# Patient Record
Sex: MALE | Race: WHITE | URBAN - METROPOLITAN AREA
[De-identification: names, ages, dates, MRNs, and addresses within clinical notes are randomized per-mention and may not be internally consistent; named-entity substitution may affect disease eponyms.]

---

## 2022-03-04 ENCOUNTER — OUT OF OFFICE VISIT (OUTPATIENT)
Dept: URBAN - METROPOLITAN AREA MEDICAL CENTER 24 | Facility: MEDICAL CENTER | Age: 52
End: 2022-03-04
Payer: COMMERCIAL

## 2022-03-04 DIAGNOSIS — K92.1 ACUTE MELENA: ICD-10-CM

## 2022-03-04 DIAGNOSIS — K70.30 ALC (ALCOHOLIC LIVER CIRRHOSIS): ICD-10-CM

## 2022-03-04 DIAGNOSIS — K92.0 BLOODY EMESIS: ICD-10-CM

## 2022-03-04 PROCEDURE — 99254 IP/OBS CNSLTJ NEW/EST MOD 60: CPT | Performed by: INTERNAL MEDICINE

## 2022-03-05 ENCOUNTER — OUT OF OFFICE VISIT (OUTPATIENT)
Dept: URBAN - METROPOLITAN AREA MEDICAL CENTER 24 | Facility: MEDICAL CENTER | Age: 52
End: 2022-03-05
Payer: COMMERCIAL

## 2022-03-05 DIAGNOSIS — I85.10 ESOPH VARICE OTHER DIS: ICD-10-CM

## 2022-03-05 DIAGNOSIS — I86.4 BLEEDING GASTRIC VARICES: ICD-10-CM

## 2022-03-05 DIAGNOSIS — K74.69 CIRRHOSIS, CRYPTOGENIC: ICD-10-CM

## 2022-03-05 DIAGNOSIS — D69.6 ACQUIRED THROMBOCYTOPENIA: ICD-10-CM

## 2022-03-05 DIAGNOSIS — D64.89 ANEMIA DUE TO OTHER CAUSE: ICD-10-CM

## 2022-03-05 PROCEDURE — 99232 SBSQ HOSP IP/OBS MODERATE 35: CPT | Performed by: INTERNAL MEDICINE

## 2022-03-05 PROCEDURE — 43235 EGD DIAGNOSTIC BRUSH WASH: CPT | Performed by: INTERNAL MEDICINE

## 2022-03-07 ENCOUNTER — OUT OF OFFICE VISIT (OUTPATIENT)
Dept: URBAN - METROPOLITAN AREA MEDICAL CENTER 24 | Facility: MEDICAL CENTER | Age: 52
End: 2022-03-07
Payer: COMMERCIAL

## 2022-03-07 DIAGNOSIS — I86.4 BLEEDING GASTRIC VARICES: ICD-10-CM

## 2022-03-07 DIAGNOSIS — I85.10 ESOPH VARICE OTHER DIS: ICD-10-CM

## 2022-03-07 DIAGNOSIS — K74.69 CIRRHOSIS, CRYPTOGENIC: ICD-10-CM

## 2022-03-07 DIAGNOSIS — D62 ABLA (ACUTE BLOOD LOSS ANEMIA): ICD-10-CM

## 2022-03-07 PROCEDURE — 99232 SBSQ HOSP IP/OBS MODERATE 35: CPT | Performed by: INTERNAL MEDICINE

## 2022-03-15 ENCOUNTER — WEB ENCOUNTER (OUTPATIENT)
Dept: URBAN - METROPOLITAN AREA CLINIC 115 | Facility: CLINIC | Age: 52
End: 2022-03-15

## 2022-03-16 ENCOUNTER — WEB ENCOUNTER (OUTPATIENT)
Dept: URBAN - METROPOLITAN AREA CLINIC 115 | Facility: CLINIC | Age: 52
End: 2022-03-16

## 2022-03-16 ENCOUNTER — OFFICE VISIT (OUTPATIENT)
Dept: URBAN - METROPOLITAN AREA CLINIC 115 | Facility: CLINIC | Age: 52
End: 2022-03-16
Payer: COMMERCIAL

## 2022-03-16 VITALS
BODY MASS INDEX: 43.68 KG/M2 | HEIGHT: 71 IN | WEIGHT: 312 LBS | TEMPERATURE: 97.7 F | HEART RATE: 66 BPM | SYSTOLIC BLOOD PRESSURE: 108 MMHG | DIASTOLIC BLOOD PRESSURE: 66 MMHG

## 2022-03-16 DIAGNOSIS — D69.6 THROMBOCYTOPENIA: ICD-10-CM

## 2022-03-16 DIAGNOSIS — K57.90 DIVERTICULOSIS: ICD-10-CM

## 2022-03-16 DIAGNOSIS — K74.69 OTHER CIRRHOSIS OF LIVER: ICD-10-CM

## 2022-03-16 DIAGNOSIS — D64.9 ANEMIA, UNSPECIFIED TYPE: ICD-10-CM

## 2022-03-16 DIAGNOSIS — I86.4 GASTRIC VARICES: ICD-10-CM

## 2022-03-16 DIAGNOSIS — K42.9 UMBILICAL HERNIA WITHOUT OBSTRUCTION AND WITHOUT GANGRENE: ICD-10-CM

## 2022-03-16 DIAGNOSIS — K75.81 NASH (NONALCOHOLIC STEATOHEPATITIS): ICD-10-CM

## 2022-03-16 DIAGNOSIS — D68.9 COAGULOPATHY: ICD-10-CM

## 2022-03-16 PROCEDURE — 99214 OFFICE O/P EST MOD 30 MIN: CPT | Performed by: INTERNAL MEDICINE

## 2022-03-16 RX ORDER — NADOLOL 20 MG/1
1 TABLET TABLET ORAL ONCE A DAY
Status: ACTIVE | COMMUNITY

## 2022-03-16 RX ORDER — GLIPIZIDE 2.5 MG/1
1 TABLET WITH BREAKFAST TABLET, FILM COATED, EXTENDED RELEASE ORAL ONCE A DAY
Status: ACTIVE | COMMUNITY

## 2022-03-16 RX ORDER — LACTULOSE 10 G/10G
1 PACKET SOLUTION ORAL ONCE A DAY
Status: ACTIVE | COMMUNITY

## 2022-03-16 RX ORDER — PANTOPRAZOLE SODIUM 40 MG/1
1 TABLET TABLET, DELAYED RELEASE ORAL ONCE A DAY
Status: ACTIVE | COMMUNITY

## 2022-03-16 NOTE — PHYSICAL EXAM GASTROINTESTINAL
Abdomen , soft, nontender, nondistended , no guarding or rigidity , no masses palpable , normal bowel sounds , Liver and Spleen , no hepatomegaly present , no hepatosplenomegaly , liver nontender , spleen not palpable  REDUCIBLE UMBILICAL HERNIA

## 2022-03-21 LAB
A/G RATIO: 1.7
AFP, SERUM, TUMOR MARKER: 4.6
ALBUMIN: 3.7
ALKALINE PHOSPHATASE: 99
ALT (SGPT): 28
AST (SGOT): 32
BASO (ABSOLUTE): 0.1
BASOS: 2
BILIRUBIN, TOTAL: 0.3
BUN/CREATININE RATIO: 14
BUN: 14
CALCIUM: 8.9
CARBON DIOXIDE, TOTAL: 22
CERULOPLASMIN: 32.4
CHLORIDE: 105
COPPER, SERUM: 158
CREATININE: 0.99
EGFR: 92
EOS (ABSOLUTE): 0
EOS: 0
GLOBULIN, TOTAL: 2.2
GLUCOSE: 92
HEMATOCRIT: 27.8
HEMATOLOGY COMMENTS:: (no result)
HEMOGLOBIN: 8.9
HEP A AB, TOTAL: NEGATIVE
HEPATITIS B SURF AB QUANT: <3.1
IMMATURE CELLS: (no result)
IMMATURE GRANS (ABS): (no result)
IMMATURE GRANULOCYTES: (no result)
INR: 1
LYMPHS (ABSOLUTE): 1.3
LYMPHS: 22
MCH: 29.1
MCHC: 32
MCV: 91
MONOCYTES(ABSOLUTE): 0.5
MONOCYTES: 9
NEUTROPHILS (ABSOLUTE): 4.1
NEUTROPHILS: 67
NRBC: (no result)
PLATELETS: 24
POTASSIUM: 5.1
PROTEIN, TOTAL: 5.9
PROTHROMBIN TIME: 10.1
RBC: 3.06
RDW: 14.3
SODIUM: 141
WBC: 6.1

## 2022-04-04 ENCOUNTER — WEB ENCOUNTER (OUTPATIENT)
Dept: URBAN - METROPOLITAN AREA CLINIC 115 | Facility: CLINIC | Age: 52
End: 2022-04-04

## 2022-04-04 ENCOUNTER — OFFICE VISIT (OUTPATIENT)
Dept: URBAN - METROPOLITAN AREA CLINIC 115 | Facility: CLINIC | Age: 52
End: 2022-04-04
Payer: COMMERCIAL

## 2022-04-04 ENCOUNTER — LAB OUTSIDE AN ENCOUNTER (OUTPATIENT)
Dept: URBAN - METROPOLITAN AREA CLINIC 115 | Facility: CLINIC | Age: 52
End: 2022-04-04

## 2022-04-04 VITALS
BODY MASS INDEX: 42.48 KG/M2 | SYSTOLIC BLOOD PRESSURE: 116 MMHG | TEMPERATURE: 98.5 F | HEIGHT: 71 IN | WEIGHT: 303.4 LBS | HEART RATE: 57 BPM | DIASTOLIC BLOOD PRESSURE: 70 MMHG

## 2022-04-04 DIAGNOSIS — D68.9 COAGULOPATHY: ICD-10-CM

## 2022-04-04 DIAGNOSIS — I86.4 GASTRIC VARICES: ICD-10-CM

## 2022-04-04 DIAGNOSIS — K42.9 UMBILICAL HERNIA WITHOUT OBSTRUCTION AND WITHOUT GANGRENE: ICD-10-CM

## 2022-04-04 DIAGNOSIS — D64.9 ANEMIA, UNSPECIFIED TYPE: ICD-10-CM

## 2022-04-04 DIAGNOSIS — K75.81 NASH (NONALCOHOLIC STEATOHEPATITIS): ICD-10-CM

## 2022-04-04 DIAGNOSIS — D69.6 THROMBOCYTOPENIA: ICD-10-CM

## 2022-04-04 DIAGNOSIS — K57.90 DIVERTICULOSIS: ICD-10-CM

## 2022-04-04 DIAGNOSIS — J44.9 COPD SUGGESTED BY INITIAL EVALUATION: ICD-10-CM

## 2022-04-04 DIAGNOSIS — K74.69 OTHER CIRRHOSIS OF LIVER: ICD-10-CM

## 2022-04-04 PROCEDURE — 99213 OFFICE O/P EST LOW 20 MIN: CPT | Performed by: INTERNAL MEDICINE

## 2022-04-04 RX ORDER — NADOLOL 20 MG/1
1 TABLET TABLET ORAL ONCE A DAY
Status: ACTIVE | COMMUNITY

## 2022-04-04 RX ORDER — GLIPIZIDE 2.5 MG/1
1 TABLET WITH BREAKFAST TABLET, FILM COATED, EXTENDED RELEASE ORAL ONCE A DAY
Status: ACTIVE | COMMUNITY

## 2022-04-04 RX ORDER — NADOLOL 20 MG/1
1 TABLET TABLET ORAL ONCE A DAY
Qty: 90 | Refills: 3

## 2022-04-04 RX ORDER — PANTOPRAZOLE SODIUM 40 MG/1
1 TABLET TABLET, DELAYED RELEASE ORAL ONCE A DAY
Status: ACTIVE | COMMUNITY

## 2022-04-04 RX ORDER — LACTULOSE 10 G/10G
1 PACKET SOLUTION ORAL ONCE A DAY
Status: ACTIVE | COMMUNITY

## 2022-04-04 NOTE — HPI-TODAY'S VISIT:
call from radilogist, for follow   no stomach issue  low sodium diet. lost wt ,   no birght red blood  lactulose titrate 2-3 bm a day

## 2022-04-29 ENCOUNTER — OFFICE VISIT (OUTPATIENT)
Dept: URBAN - METROPOLITAN AREA CLINIC 54 | Facility: CLINIC | Age: 52
End: 2022-04-29
Payer: COMMERCIAL

## 2022-04-29 VITALS
TEMPERATURE: 97.1 F | HEART RATE: 59 BPM | DIASTOLIC BLOOD PRESSURE: 76 MMHG | WEIGHT: 299 LBS | SYSTOLIC BLOOD PRESSURE: 117 MMHG | HEIGHT: 71 IN | BODY MASS INDEX: 41.86 KG/M2

## 2022-04-29 DIAGNOSIS — J44.9 COPD SUGGESTED BY INITIAL EVALUATION: ICD-10-CM

## 2022-04-29 DIAGNOSIS — K57.90 DIVERTICULOSIS: ICD-10-CM

## 2022-04-29 DIAGNOSIS — I86.4 GASTRIC VARICES: ICD-10-CM

## 2022-04-29 DIAGNOSIS — D64.9 ANEMIA, UNSPECIFIED TYPE: ICD-10-CM

## 2022-04-29 DIAGNOSIS — K74.69 OTHER CIRRHOSIS OF LIVER: ICD-10-CM

## 2022-04-29 DIAGNOSIS — D69.6 THROMBOCYTOPENIA: ICD-10-CM

## 2022-04-29 DIAGNOSIS — K42.9 UMBILICAL HERNIA WITHOUT OBSTRUCTION AND WITHOUT GANGRENE: ICD-10-CM

## 2022-04-29 DIAGNOSIS — D68.9 COAGULOPATHY: ICD-10-CM

## 2022-04-29 DIAGNOSIS — K75.81 NASH (NONALCOHOLIC STEATOHEPATITIS): ICD-10-CM

## 2022-04-29 PROCEDURE — 99214 OFFICE O/P EST MOD 30 MIN: CPT | Performed by: INTERNAL MEDICINE

## 2022-04-29 RX ORDER — LACTULOSE 10 G/10G
1 PACKET SOLUTION ORAL ONCE A DAY
Status: ACTIVE | COMMUNITY

## 2022-04-29 RX ORDER — NADOLOL 20 MG/1
1 TABLET TABLET ORAL ONCE A DAY
Qty: 90 | Refills: 3 | Status: ACTIVE | COMMUNITY

## 2022-04-29 RX ORDER — GLIPIZIDE 2.5 MG/1
1 TABLET WITH BREAKFAST TABLET, FILM COATED, EXTENDED RELEASE ORAL ONCE A DAY
Status: ACTIVE | COMMUNITY

## 2022-04-29 RX ORDER — PANTOPRAZOLE SODIUM 40 MG/1
1 TABLET TABLET, DELAYED RELEASE ORAL ONCE A DAY
COMMUNITY

## 2022-04-29 NOTE — HPI-TODAY'S VISIT:
DOING WELL, BM IS GARCIA, NO DARK OR BRIGHT RED, NO INDICATION OF BLOOD NO ABDOMINAL PAIN, SLIGHT DISCOMFORT TAKES LACTULOSE, ONCE A DAY,  EAT GOOD, NOT ON ANY IRON, ADV TO TKAE FEOSOL, BID NO OTHER ISSUES.  GOING TO SEE PCP  Follow Up 4/29/22: Patient presents for a liver related follow up. He has decompensated NAFLD cirrhosis. Recent MELD 8. He was at his local hospital in March 2022 for Gastric variceal bleed. He required 3 U of PRBC's. He underwent urgent TIPS with resolution of bleeding. Also newly diagnosed diabetic. He has lost 18 lbs with dieting. He does not have ascites but started on Lactulose 10 gm BID. Also taking Nadolol 20 QD. No family history of liver  disease. Denies ETOH use. Viral markers per notes negative. Ceruloplasmin mildly elevated.

## 2022-04-29 NOTE — PHYSICAL EXAM GASTROINTESTINAL
Abdomen , soft, nontender, nondistended , excessive abdominal fat, reducible umbilical hernia, no guarding or rigidity , no masses palpable , normal bowel sounds , Liver and Spleen , Hepatomegaly.

## 2022-04-30 LAB
A/G RATIO: 2
ALBUMIN: 4.5
ALKALINE PHOSPHATASE: 97
ALT (SGPT): 34
AST (SGOT): 29
BASO (ABSOLUTE): 0.1
BASOS: 1
BILIRUBIN, TOTAL: 0.6
BUN/CREATININE RATIO: 18
BUN: 15
CALCIUM: 9.5
CARBON DIOXIDE, TOTAL: 19
CERULOPLASMIN: 25.9
CHLORIDE: 102
CREATININE: 0.83
EGFR: 105
EOS (ABSOLUTE): 0.1
EOS: 2
FERRITIN, SERUM: 32
GLOBULIN, TOTAL: 2.2
GLUCOSE: 135
HEMATOCRIT: 41.7
HEMATOLOGY COMMENTS:: (no result)
HEMOGLOBIN: 12.6
IMMATURE CELLS: (no result)
IMMATURE GRANS (ABS): 0
IMMATURE GRANULOCYTES: 0
INR: 0.9
LYMPHS (ABSOLUTE): 0.9
LYMPHS: 19
MCH: 26.5
MCHC: 30.2
MCV: 88
MONOCYTES(ABSOLUTE): 0.6
MONOCYTES: 11
NEUTROPHILS (ABSOLUTE): 3.4
NEUTROPHILS: 67
NRBC: (no result)
PLATELETS: (no result)
POTASSIUM: 4.5
PROTEIN, TOTAL: 6.7
PROTHROMBIN TIME: 9.9
RBC: 4.75
RDW: 17.9
SODIUM: 138
WBC: 5

## 2022-05-01 ENCOUNTER — TELEPHONE ENCOUNTER (OUTPATIENT)
Dept: URBAN - METROPOLITAN AREA CLINIC 92 | Facility: CLINIC | Age: 52
End: 2022-05-01

## 2022-05-18 ENCOUNTER — OFFICE VISIT (OUTPATIENT)
Dept: URBAN - NONMETROPOLITAN AREA CLINIC 4 | Facility: CLINIC | Age: 52
End: 2022-05-18

## 2022-05-25 ENCOUNTER — WEB ENCOUNTER (OUTPATIENT)
Dept: URBAN - METROPOLITAN AREA CLINIC 115 | Facility: CLINIC | Age: 52
End: 2022-05-25

## 2022-05-25 RX ORDER — NADOLOL 20 MG/1
1 TABLET TABLET ORAL ONCE A DAY
Qty: 90 | Refills: 3

## 2022-07-18 ENCOUNTER — OFFICE VISIT (OUTPATIENT)
Dept: URBAN - METROPOLITAN AREA CLINIC 115 | Facility: CLINIC | Age: 52
End: 2022-07-18

## 2022-08-01 ENCOUNTER — WEB ENCOUNTER (OUTPATIENT)
Dept: URBAN - METROPOLITAN AREA CLINIC 115 | Facility: CLINIC | Age: 52
End: 2022-08-01

## 2022-08-01 ENCOUNTER — OFFICE VISIT (OUTPATIENT)
Dept: URBAN - METROPOLITAN AREA CLINIC 115 | Facility: CLINIC | Age: 52
End: 2022-08-01
Payer: COMMERCIAL

## 2022-08-01 VITALS
TEMPERATURE: 97.7 F | DIASTOLIC BLOOD PRESSURE: 75 MMHG | BODY MASS INDEX: 41.72 KG/M2 | HEIGHT: 71 IN | HEART RATE: 64 BPM | SYSTOLIC BLOOD PRESSURE: 121 MMHG | WEIGHT: 298 LBS

## 2022-08-01 DIAGNOSIS — D68.9 COAGULOPATHY: ICD-10-CM

## 2022-08-01 DIAGNOSIS — J44.9 COPD SUGGESTED BY INITIAL EVALUATION: ICD-10-CM

## 2022-08-01 DIAGNOSIS — D69.6 THROMBOCYTOPENIA: ICD-10-CM

## 2022-08-01 DIAGNOSIS — D64.9 ANEMIA, UNSPECIFIED TYPE: ICD-10-CM

## 2022-08-01 DIAGNOSIS — K74.69 OTHER CIRRHOSIS OF LIVER: ICD-10-CM

## 2022-08-01 DIAGNOSIS — K75.81 NASH (NONALCOHOLIC STEATOHEPATITIS): ICD-10-CM

## 2022-08-01 DIAGNOSIS — I86.4 GASTRIC VARICES: ICD-10-CM

## 2022-08-01 DIAGNOSIS — K42.9 UMBILICAL HERNIA WITHOUT OBSTRUCTION AND WITHOUT GANGRENE: ICD-10-CM

## 2022-08-01 DIAGNOSIS — K57.90 DIVERTICULOSIS: ICD-10-CM

## 2022-08-01 PROBLEM — 91109007: Status: ACTIVE | Noted: 2022-03-16

## 2022-08-01 PROBLEM — 271737000: Status: ACTIVE | Noted: 2022-03-16

## 2022-08-01 PROCEDURE — 99213 OFFICE O/P EST LOW 20 MIN: CPT | Performed by: INTERNAL MEDICINE

## 2022-08-01 RX ORDER — NADOLOL 20 MG/1
1 TABLET TABLET ORAL ONCE A DAY
Qty: 90 | Refills: 3 | Status: ACTIVE | COMMUNITY

## 2022-08-01 RX ORDER — LACTULOSE 10 G/10G
1 PACKET SOLUTION ORAL ONCE A DAY
Status: ACTIVE | COMMUNITY

## 2022-08-01 RX ORDER — GLIPIZIDE 2.5 MG/1
1 TABLET WITH BREAKFAST TABLET, FILM COATED, EXTENDED RELEASE ORAL ONCE A DAY
Status: ACTIVE | COMMUNITY

## 2022-08-01 RX ORDER — PANTOPRAZOLE SODIUM 40 MG/1
1 TABLET TABLET, DELAYED RELEASE ORAL ONCE A DAY
COMMUNITY

## 2022-08-01 NOTE — HPI-TODAY'S VISIT:
SEEN DR COX.   NO SYMPTOMS. DOING WELL,   STAYED ON MEDS, DOING EXERCISE.   10 LBS WT LOSS   NO ETOH.

## 2022-10-03 ENCOUNTER — LAB OUTSIDE AN ENCOUNTER (OUTPATIENT)
Dept: URBAN - METROPOLITAN AREA CLINIC 115 | Facility: CLINIC | Age: 52
End: 2022-10-03

## 2022-11-03 ENCOUNTER — WEB ENCOUNTER (OUTPATIENT)
Dept: URBAN - METROPOLITAN AREA CLINIC 115 | Facility: CLINIC | Age: 52
End: 2022-11-03

## 2022-11-18 ENCOUNTER — LAB OUTSIDE AN ENCOUNTER (OUTPATIENT)
Dept: URBAN - METROPOLITAN AREA CLINIC 82 | Facility: CLINIC | Age: 52
End: 2022-11-18

## 2022-11-18 LAB
CREATININE POC: 0.8
PERFORMING LAB: (no result)

## 2022-11-22 ENCOUNTER — TELEPHONE ENCOUNTER (OUTPATIENT)
Dept: URBAN - METROPOLITAN AREA CLINIC 92 | Facility: CLINIC | Age: 52
End: 2022-11-22

## 2022-11-22 ENCOUNTER — LAB OUTSIDE AN ENCOUNTER (OUTPATIENT)
Dept: URBAN - METROPOLITAN AREA CLINIC 92 | Facility: CLINIC | Age: 52
End: 2022-11-22

## 2022-11-22 PROBLEM — 15634181000119107: Status: ACTIVE | Noted: 2022-11-22

## 2023-01-12 ENCOUNTER — TELEPHONE ENCOUNTER (OUTPATIENT)
Dept: URBAN - METROPOLITAN AREA CLINIC 115 | Facility: CLINIC | Age: 53
End: 2023-01-12

## 2023-01-30 ENCOUNTER — OFFICE VISIT (OUTPATIENT)
Dept: URBAN - METROPOLITAN AREA CLINIC 54 | Facility: CLINIC | Age: 53
End: 2023-01-30
Payer: COMMERCIAL

## 2023-01-30 ENCOUNTER — TELEPHONE ENCOUNTER (OUTPATIENT)
Dept: URBAN - METROPOLITAN AREA CLINIC 92 | Facility: CLINIC | Age: 53
End: 2023-01-30

## 2023-01-30 VITALS
BODY MASS INDEX: 40.74 KG/M2 | WEIGHT: 291 LBS | TEMPERATURE: 97.8 F | SYSTOLIC BLOOD PRESSURE: 117 MMHG | HEART RATE: 62 BPM | DIASTOLIC BLOOD PRESSURE: 69 MMHG | HEIGHT: 71 IN

## 2023-01-30 DIAGNOSIS — K57.90 DIVERTICULOSIS: ICD-10-CM

## 2023-01-30 DIAGNOSIS — J44.9 COPD SUGGESTED BY INITIAL EVALUATION: ICD-10-CM

## 2023-01-30 DIAGNOSIS — K42.9 UMBILICAL HERNIA WITHOUT OBSTRUCTION AND WITHOUT GANGRENE: ICD-10-CM

## 2023-01-30 DIAGNOSIS — D68.9 COAGULOPATHY: ICD-10-CM

## 2023-01-30 DIAGNOSIS — D69.6 THROMBOCYTOPENIA: ICD-10-CM

## 2023-01-30 DIAGNOSIS — D64.9 ANEMIA, UNSPECIFIED TYPE: ICD-10-CM

## 2023-01-30 DIAGNOSIS — I86.4 GASTRIC VARICES: ICD-10-CM

## 2023-01-30 DIAGNOSIS — K75.81 NASH (NONALCOHOLIC STEATOHEPATITIS): ICD-10-CM

## 2023-01-30 DIAGNOSIS — K74.69 OTHER CIRRHOSIS OF LIVER: ICD-10-CM

## 2023-01-30 PROBLEM — 13645005 CHRONIC OBSTRUCTIVE PULMONARY DISEASE: Status: ACTIVE | Noted: 2023-01-30

## 2023-01-30 PROBLEM — 64779008: Status: ACTIVE | Noted: 2022-03-16

## 2023-01-30 PROBLEM — 397881000: Status: ACTIVE | Noted: 2022-03-16

## 2023-01-30 PROBLEM — 415116008: Status: ACTIVE | Noted: 2022-03-16

## 2023-01-30 PROCEDURE — 99214 OFFICE O/P EST MOD 30 MIN: CPT | Performed by: INTERNAL MEDICINE

## 2023-01-30 RX ORDER — LACTULOSE 10 G/10G
1 PACKET SOLUTION ORAL ONCE A DAY
Status: ACTIVE | COMMUNITY

## 2023-01-30 RX ORDER — GLIPIZIDE 2.5 MG/1
1 TABLET WITH BREAKFAST TABLET, FILM COATED, EXTENDED RELEASE ORAL ONCE A DAY
Status: ACTIVE | COMMUNITY

## 2023-01-30 RX ORDER — NADOLOL 20 MG/1
1 TABLET TABLET ORAL ONCE A DAY
Qty: 90 | Refills: 3 | Status: ACTIVE | COMMUNITY

## 2023-01-30 NOTE — HPI-TODAY'S VISIT:
DOING WELL, BM IS GARCIA, NO DARK OR BRIGHT RED, NO INDICATION OF BLOOD NO ABDOMINAL PAIN, SLIGHT DISCOMFORT TAKES LACTULOSE, ONCE A DAY,  EAT GOOD, NOT ON ANY IRON, ADV TO TKAE FEOSOL, BID NO OTHER ISSUES.  GOING TO SEE PCP  Follow Up 4/29/22: Patient presents for a liver related follow up. He has decompensated NAFLD cirrhosis. Recent MELD 8. He was at his local hospital in March 2022 for Gastric variceal bleed. He required 3 U of PRBC's. He underwent urgent TIPS with resolution of bleeding. Also newly diagnosed diabetic. He has lost 18 lbs with dieting. He does not have ascites but started on Lactulose 10 gm BID. Also taking Nadolol 20 QD. No family history of liver  disease. Denies ETOH use. Viral markers per notes negative. Ceruloplasmin mildly elevated.  Follow Up 1/30/23: Patient presents for routine follow up. MRI in Dec 2022 revealed an indeterminate 1.5 cm Segment 2 lesion (? LIRAD-3). AFP early 2022 was normal. He is physically active and has lost 40 lbs. Remains compensated.

## 2023-02-01 PROBLEM — 19943007: Status: ACTIVE | Noted: 2022-03-16

## 2023-02-01 PROBLEM — 300331000 LESION OF LIVER: Status: ACTIVE | Noted: 2023-02-01

## 2023-02-01 PROBLEM — 442685003: Status: ACTIVE | Noted: 2022-03-16

## 2023-02-02 ENCOUNTER — TELEPHONE ENCOUNTER (OUTPATIENT)
Dept: URBAN - METROPOLITAN AREA CLINIC 115 | Facility: CLINIC | Age: 53
End: 2023-02-02

## 2023-02-10 LAB
A/G RATIO: 1.8
ABSOLUTE BASOPHILS: 51
ABSOLUTE EOSINOPHILS: 154
ABSOLUTE LYMPHOCYTES: 1072
ABSOLUTE MONOCYTES: 496
ABSOLUTE NEUTROPHILS: 3927
AFP, SERUM, TUMOR MARKER: 5.1
ALBUMIN: 4.2
ALKALINE PHOSPHATASE: 56
ALT (SGPT): 46
AST (SGOT): 29
BASOPHILS: 0.9
BILIRUBIN, TOTAL: 1
BUN/CREATININE RATIO: (no result)
BUN: 21
CALCIUM: 9
CARBON DIOXIDE, TOTAL: 23
CHLORIDE: 103
COMMENT(S): (no result)
CREATININE: 0.73
EGFR: 109
EOSINOPHILS: 2.7
GLOBULIN, TOTAL: 2.3
GLUCOSE: 115
HEMATOCRIT: 45.8
HEMOGLOBIN: 15.7
INR: 1
LYMPHOCYTES: 18.8
MCH: 30.8
MCHC: 34.3
MCV: 89.8
MONOCYTES: 8.7
NEUTROPHILS: 68.9
PLATELET COUNT: (no result)
POTASSIUM: 3.9
PROTEIN, TOTAL: 6.5
PT: 10.6
RDW: 13.3
RED BLOOD CELL COUNT: 5.1
SODIUM: 140
WHITE BLOOD CELL COUNT: 5.7

## 2023-07-31 ENCOUNTER — TELEPHONE ENCOUNTER (OUTPATIENT)
Dept: URBAN - METROPOLITAN AREA CLINIC 115 | Facility: CLINIC | Age: 53
End: 2023-07-31

## 2023-07-31 ENCOUNTER — OFFICE VISIT (OUTPATIENT)
Dept: URBAN - METROPOLITAN AREA CLINIC 115 | Facility: CLINIC | Age: 53
End: 2023-07-31
Payer: COMMERCIAL

## 2023-07-31 ENCOUNTER — LAB OUTSIDE AN ENCOUNTER (OUTPATIENT)
Dept: URBAN - METROPOLITAN AREA CLINIC 115 | Facility: CLINIC | Age: 53
End: 2023-07-31

## 2023-07-31 VITALS
TEMPERATURE: 98.2 F | DIASTOLIC BLOOD PRESSURE: 78 MMHG | BODY MASS INDEX: 43.2 KG/M2 | HEART RATE: 56 BPM | SYSTOLIC BLOOD PRESSURE: 122 MMHG | HEIGHT: 71 IN | WEIGHT: 308.6 LBS

## 2023-07-31 DIAGNOSIS — I86.4 GASTRIC VARICES: ICD-10-CM

## 2023-07-31 DIAGNOSIS — Z12.11 COLON CANCER SCREENING: ICD-10-CM

## 2023-07-31 DIAGNOSIS — R93.5 ABNORMAL ABDOMINAL CT SCAN: ICD-10-CM

## 2023-07-31 DIAGNOSIS — K76.9 LIVER LESION: ICD-10-CM

## 2023-07-31 DIAGNOSIS — R93.2 ABNORMAL CT SCAN, LIVER: ICD-10-CM

## 2023-07-31 DIAGNOSIS — K75.81 NASH (NONALCOHOLIC STEATOHEPATITIS): ICD-10-CM

## 2023-07-31 DIAGNOSIS — K92.2 GASTROINTESTINAL HEMORRHAGE, UNSPECIFIED GASTROINTESTINAL HEMORRHAGE TYPE: ICD-10-CM

## 2023-07-31 DIAGNOSIS — K74.69 OTHER CIRRHOSIS OF LIVER: ICD-10-CM

## 2023-07-31 PROBLEM — 74474003: Status: ACTIVE | Noted: 2023-07-31

## 2023-07-31 PROCEDURE — 99214 OFFICE O/P EST MOD 30 MIN: CPT | Performed by: INTERNAL MEDICINE

## 2023-07-31 RX ORDER — BISACODYL 5 MG
2 TABLET, DELAYED RELEASE (ENTERIC COATED) ORAL ONCE A DAY
Qty: 4
Start: 2023-07-31 | End: 2023-08-02

## 2023-07-31 RX ORDER — POLYETHYLENE GLYCOL 3350, SODIUM SULFATE ANHYDROUS, SODIUM BICARBONATE, SODIUM CHLORIDE, POTASSIUM CHLORIDE 227.1; 21.5; 6.36; 5.53; .754 G/L; G/L; G/L; G/L; G/L
236 GM POWDER, FOR SOLUTION ORAL
Qty: 1 GALLON | Refills: 0 | OUTPATIENT
Start: 2023-07-31 | End: 2023-08-01

## 2023-07-31 RX ORDER — BISACODYL 5 MG
2 TABLET, DELAYED RELEASE (ENTERIC COATED) ORAL ONCE A DAY
Qty: 4 | OUTPATIENT
Start: 2023-07-31 | End: 2023-08-02

## 2023-07-31 RX ORDER — NADOLOL 20 MG/1
1 TABLET TABLET ORAL ONCE A DAY
Qty: 90 | Refills: 3 | Status: ACTIVE | COMMUNITY

## 2023-07-31 RX ORDER — ATORVASTATIN CALCIUM 10 MG/1
1 TABLET TABLET, FILM COATED ORAL ONCE A DAY
Status: ACTIVE | COMMUNITY

## 2023-07-31 RX ORDER — LACTULOSE 10 G/10G
1 PACKET SOLUTION ORAL ONCE A DAY
Status: ACTIVE | COMMUNITY

## 2023-07-31 RX ORDER — POLYETHYLENE GLYCOL 3350, SODIUM SULFATE ANHYDROUS, SODIUM BICARBONATE, SODIUM CHLORIDE, POTASSIUM CHLORIDE 227.1; 21.5; 6.36; 5.53; .754 G/L; G/L; G/L; G/L; G/L
236 GM POWDER, FOR SOLUTION ORAL
Qty: 1 GALLON | Refills: 0
Start: 2023-07-31 | End: 2023-08-01

## 2023-07-31 RX ORDER — GLIPIZIDE 2.5 MG/1
1 TABLET WITH BREAKFAST TABLET, FILM COATED, EXTENDED RELEASE ORAL ONCE A DAY
Status: ACTIVE | COMMUNITY

## 2023-07-31 NOTE — HPI-TODAY'S VISIT:
no bleeding, gained wt   got off diet and put wt on.   was refered to see dietician.  scheduled for ct scan on 9th august.   blood works from pcp, need to have report.   never had colonsocopy.  BM GOOD, WANTS TO HAVE COLONSCOPY DONE. TAKES LACTULOSE 2TIMES A WEEK.

## 2023-07-31 NOTE — PHYSICAL EXAM NECK/THYROID:
normal appearance , without tenderness upon palpation , no deformities , trachea midline , Thyroid normal size , no thyroid nodules , no masses , no JVD , thyroid nontender all discharge safety follow up care to MD. Follow up with DR Guerrero today and bring eye drops to the appointment. Rest at home keep water out of eye. Eat a good lunch.

## 2023-07-31 NOTE — PHYSICAL EXAM NEUROLOGIC:
oriented to person, place and time , normal sensation , short and long term memory intact Abdomen soft, non-tender, no guarding.

## 2023-08-01 ENCOUNTER — LAB OUTSIDE AN ENCOUNTER (OUTPATIENT)
Dept: URBAN - METROPOLITAN AREA CLINIC 92 | Facility: CLINIC | Age: 53
End: 2023-08-01

## 2023-08-10 ENCOUNTER — TELEPHONE ENCOUNTER (OUTPATIENT)
Dept: URBAN - METROPOLITAN AREA CLINIC 54 | Facility: CLINIC | Age: 53
End: 2023-08-10

## 2023-08-18 ENCOUNTER — TELEPHONE ENCOUNTER (OUTPATIENT)
Dept: URBAN - METROPOLITAN AREA CLINIC 115 | Facility: CLINIC | Age: 53
End: 2023-08-18

## 2023-09-01 ENCOUNTER — OFFICE VISIT (OUTPATIENT)
Dept: URBAN - METROPOLITAN AREA SURGERY CENTER 13 | Facility: SURGERY CENTER | Age: 53
End: 2023-09-01
Payer: COMMERCIAL

## 2023-09-01 DIAGNOSIS — Q43.8 CONGENITAL REDUNDANT COLON: ICD-10-CM

## 2023-09-01 DIAGNOSIS — K74.69 OTHER CIRRHOSIS OF LIVER: ICD-10-CM

## 2023-09-01 DIAGNOSIS — I85.00 ESOPHAGEAL VARICES: ICD-10-CM

## 2023-09-01 DIAGNOSIS — K44.9 HIATAL HERNIA: ICD-10-CM

## 2023-09-01 DIAGNOSIS — Z12.11 COLON CANCER SCREENING: ICD-10-CM

## 2023-09-01 DIAGNOSIS — K64.0 FIRST DEGREE HEMORRHOIDS: ICD-10-CM

## 2023-09-01 DIAGNOSIS — K57.30 DIVERTCULOSIS OF LG INT W/O PERFORATION OR ABSCESS W/O BLEEDING: ICD-10-CM

## 2023-09-01 PROBLEM — 14223005: Status: ACTIVE | Noted: 2023-09-01

## 2023-09-01 PROCEDURE — G8907 PT DOC NO EVENTS ON DISCHARG: HCPCS | Performed by: INTERNAL MEDICINE

## 2023-09-01 PROCEDURE — 45378 DIAGNOSTIC COLONOSCOPY: CPT | Performed by: INTERNAL MEDICINE

## 2023-09-01 PROCEDURE — 00813 ANES UPR LWR GI NDSC PX: CPT | Performed by: ANESTHESIOLOGY

## 2023-09-01 PROCEDURE — 43235 EGD DIAGNOSTIC BRUSH WASH: CPT | Performed by: INTERNAL MEDICINE

## 2023-09-01 PROCEDURE — 00813 ANES UPR LWR GI NDSC PX: CPT

## 2023-09-01 RX ORDER — NADOLOL 20 MG/1
1 TABLET TABLET ORAL ONCE A DAY
Qty: 90 | Refills: 3 | Status: ACTIVE | COMMUNITY

## 2023-09-01 RX ORDER — LACTULOSE 10 G/10G
1 PACKET SOLUTION ORAL ONCE A DAY
Status: ACTIVE | COMMUNITY

## 2023-09-01 RX ORDER — GLIPIZIDE 2.5 MG/1
1 TABLET WITH BREAKFAST TABLET, FILM COATED, EXTENDED RELEASE ORAL ONCE A DAY
Status: ACTIVE | COMMUNITY

## 2023-09-01 RX ORDER — ATORVASTATIN CALCIUM 10 MG/1
1 TABLET TABLET, FILM COATED ORAL ONCE A DAY
Status: ACTIVE | COMMUNITY

## 2023-09-07 ENCOUNTER — TELEPHONE ENCOUNTER (OUTPATIENT)
Dept: URBAN - METROPOLITAN AREA CLINIC 115 | Facility: CLINIC | Age: 53
End: 2023-09-07

## 2023-09-22 ENCOUNTER — LAB OUTSIDE AN ENCOUNTER (OUTPATIENT)
Dept: URBAN - METROPOLITAN AREA CLINIC 54 | Facility: CLINIC | Age: 53
End: 2023-09-22

## 2023-09-22 LAB
CREATININE POC: 0.7
PERFORMING LAB: (no result)

## 2024-01-19 ENCOUNTER — OFFICE VISIT (OUTPATIENT)
Dept: URBAN - METROPOLITAN AREA CLINIC 54 | Facility: CLINIC | Age: 54
End: 2024-01-19

## 2024-03-04 ENCOUNTER — LAB (OUTPATIENT)
Dept: URBAN - METROPOLITAN AREA CLINIC 54 | Facility: CLINIC | Age: 54
End: 2024-03-04

## 2024-03-04 ENCOUNTER — OV EP (OUTPATIENT)
Dept: URBAN - METROPOLITAN AREA CLINIC 54 | Facility: CLINIC | Age: 54
End: 2024-03-04
Payer: COMMERCIAL

## 2024-03-04 VITALS
HEART RATE: 57 BPM | TEMPERATURE: 97.7 F | BODY MASS INDEX: 44.1 KG/M2 | HEIGHT: 71 IN | DIASTOLIC BLOOD PRESSURE: 97 MMHG | SYSTOLIC BLOOD PRESSURE: 142 MMHG | WEIGHT: 315 LBS

## 2024-03-04 DIAGNOSIS — I86.4 GASTRIC VARICES: ICD-10-CM

## 2024-03-04 DIAGNOSIS — K75.81 NASH (NONALCOHOLIC STEATOHEPATITIS): ICD-10-CM

## 2024-03-04 DIAGNOSIS — R93.2 ABNORMAL CT SCAN, LIVER: ICD-10-CM

## 2024-03-04 DIAGNOSIS — K76.9 LIVER LESION: ICD-10-CM

## 2024-03-04 DIAGNOSIS — Z12.11 COLON CANCER SCREENING: ICD-10-CM

## 2024-03-04 DIAGNOSIS — K92.2 GASTROINTESTINAL HEMORRHAGE, UNSPECIFIED GASTROINTESTINAL HEMORRHAGE TYPE: ICD-10-CM

## 2024-03-04 DIAGNOSIS — R93.5 ABNORMAL ABDOMINAL CT SCAN: ICD-10-CM

## 2024-03-04 DIAGNOSIS — K74.69 OTHER CIRRHOSIS OF LIVER: ICD-10-CM

## 2024-03-04 PROCEDURE — 99214 OFFICE O/P EST MOD 30 MIN: CPT | Performed by: INTERNAL MEDICINE

## 2024-03-04 RX ORDER — LACTULOSE 10 G/10G
1 PACKET SOLUTION ORAL ONCE A DAY
Status: ACTIVE | COMMUNITY

## 2024-03-04 RX ORDER — ATORVASTATIN CALCIUM 10 MG/1
1 TABLET TABLET, FILM COATED ORAL ONCE A DAY
Status: ACTIVE | COMMUNITY

## 2024-03-04 RX ORDER — NADOLOL 20 MG/1
1 TABLET TABLET ORAL ONCE A DAY
Qty: 90 | Refills: 3 | Status: ACTIVE | COMMUNITY

## 2024-03-04 RX ORDER — GLIPIZIDE 2.5 MG/1
1 TABLET WITH BREAKFAST TABLET, FILM COATED, EXTENDED RELEASE ORAL ONCE A DAY
Status: ACTIVE | COMMUNITY

## 2024-03-04 NOTE — HPI-TODAY'S VISIT:
DOING WELL, BM IS GARCIA, NO DARK OR BRIGHT RED, NO INDICATION OF BLOOD NO ABDOMINAL PAIN, SLIGHT DISCOMFORT TAKES LACTULOSE, ONCE A DAY,  EAT GOOD, NOT ON ANY IRON, ADV TO TKAE FEOSOL, BID NO OTHER ISSUES.  GOING TO SEE PCP  Follow Up 4/29/22: Patient presents for a liver related follow up. He has decompensated NAFLD cirrhosis. Recent MELD 8. He was at his local hospital in March 2022 for Gastric variceal bleed. He required 3 U of PRBC's. He underwent urgent TIPS with resolution of bleeding. Also newly diagnosed diabetic. He has lost 18 lbs with dieting. He does not have ascites but started on Lactulose 10 gm BID. Also taking Nadolol 20 QD. No family history of liver  disease. Denies ETOH use. Viral markers per notes negative. Ceruloplasmin mildly elevated.  Follow Up 1/30/23: Patient presents for routine follow up. MRI in Dec 2022 revealed an indeterminate 1.5 cm Segment 2 lesion (? LIRAD-3). AFP early 2022 was normal. He is physically active and has lost 40 lbs. Remains compensated.  Follow Up 3/4/24: Patient presents for routine follow up. CT in Sep 2023 was negative for HCC. Previously seen lesion not identified. Remains compensated. He is struggling with weight loss. He is conserding Ozempic via his PCP. He hs regained 20 lbs. EGD showed Grade 2 varices. He remains on Nadolol 20 QD. He is using Lactulose as needed.

## 2024-03-26 ENCOUNTER — LAB (OUTPATIENT)
Dept: URBAN - METROPOLITAN AREA CLINIC 54 | Facility: CLINIC | Age: 54
End: 2024-03-26